# Patient Record
Sex: MALE | Race: WHITE | NOT HISPANIC OR LATINO | URBAN - METROPOLITAN AREA
[De-identification: names, ages, dates, MRNs, and addresses within clinical notes are randomized per-mention and may not be internally consistent; named-entity substitution may affect disease eponyms.]

---

## 2022-04-27 ENCOUNTER — OFFICE VISIT (OUTPATIENT)
Dept: URBAN - METROPOLITAN AREA CLINIC 88 | Facility: CLINIC | Age: 77
End: 2022-04-27
Payer: MEDICARE

## 2022-04-27 DIAGNOSIS — H43.811 VITREOUS DEGENERATION, RIGHT EYE: Primary | ICD-10-CM

## 2022-04-27 DIAGNOSIS — H40.013 OPEN ANGLE WITH BORDERLINE FINDINGS, LOW RISK, BILATERAL: ICD-10-CM

## 2022-04-27 DIAGNOSIS — H25.13 AGE-RELATED NUCLEAR CATARACT, BILATERAL: ICD-10-CM

## 2022-04-27 PROCEDURE — 99203 OFFICE O/P NEW LOW 30 MIN: CPT | Performed by: OPHTHALMOLOGY

## 2022-04-27 ASSESSMENT — INTRAOCULAR PRESSURE
OS: 12
OD: 12

## 2022-04-27 NOTE — IMPRESSION/PLAN
Impression: Vitreous degeneration, right eye: H43.811. Plan: Discussed diagnosis in detail with patient. There is no evidence of retinal pathology. All signs and risks of retinal detachment and tears were discussed in detail. Patient was advised if any changes of VA, flashes, floaters or if curtain fall in [facility]. Patient instructed to call the office immediately if any symptoms noted.

## 2022-05-05 ENCOUNTER — OFFICE VISIT (OUTPATIENT)
Dept: URBAN - METROPOLITAN AREA CLINIC 88 | Facility: CLINIC | Age: 77
End: 2022-05-05
Payer: MEDICARE

## 2022-05-05 DIAGNOSIS — H33.21 RETINAL DETACHMENT OF RIGHT EYE: Primary | ICD-10-CM

## 2022-05-05 PROCEDURE — 99213 OFFICE O/P EST LOW 20 MIN: CPT | Performed by: OPHTHALMOLOGY

## 2022-05-05 ASSESSMENT — INTRAOCULAR PRESSURE
OS: 12
OD: 12

## 2022-05-05 NOTE — IMPRESSION/PLAN
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 Plan: Discussed eye findings with patient. Patient is being referred to [facility] for evaluation and treatment of Retinal detachment w/macula off.

## 2022-05-05 NOTE — IMPRESSION/PLAN
Impression: Age-related nuclear cataract, bilateral: H25.13. Plan: Surgery not indicated, will monitor.

## 2022-08-30 ENCOUNTER — OFFICE VISIT (OUTPATIENT)
Dept: URBAN - METROPOLITAN AREA CLINIC 88 | Facility: CLINIC | Age: 77
End: 2022-08-30
Payer: MEDICARE

## 2022-08-30 DIAGNOSIS — H35.371 PUCKERING OF MACULA, RIGHT EYE: ICD-10-CM

## 2022-08-30 DIAGNOSIS — H25.811 COMBINED FORMS OF AGE-RELATED CATARACT, RIGHT EYE: Primary | ICD-10-CM

## 2022-08-30 DIAGNOSIS — H33.21 RETINAL DETACHMENT OF RIGHT EYE: ICD-10-CM

## 2022-08-30 DIAGNOSIS — H25.12 AGE-RELATED NUCLEAR CATARACT, LEFT EYE: ICD-10-CM

## 2022-08-30 PROCEDURE — 92134 CPTRZ OPH DX IMG PST SGM RTA: CPT | Performed by: OPHTHALMOLOGY

## 2022-08-30 PROCEDURE — 92136 OPHTHALMIC BIOMETRY: CPT | Performed by: OPHTHALMOLOGY

## 2022-08-30 PROCEDURE — 99214 OFFICE O/P EST MOD 30 MIN: CPT | Performed by: OPHTHALMOLOGY

## 2022-08-30 ASSESSMENT — VISUAL ACUITY
OS: 20/25
OD: 20/100

## 2022-08-30 ASSESSMENT — INTRAOCULAR PRESSURE
OS: 11
OD: 10

## 2022-08-30 NOTE — IMPRESSION/PLAN
Impression: Puckering of macula, right eye: H35.371. Plan: Discussed diagnosis in detail with patient. We discussed with the patient he may need Retinal surgery in the future after his Cataract surgery OD.

## 2022-08-30 NOTE — IMPRESSION/PLAN
Impression: Combined forms of age-related cataract, right eye: H25.811. Plan: Cataracts account for the patient's complaints. Discussed all risks, benefits, procedures and recovery. Patient understands changing glasses will not improve vision. Patient desires to have surgery, recommend phacoemulsification with intraocular lens. Surgical risks and benefits were discussed, explained and to patient. RL2 Schedule Cataract sx OD. Final post operative refractive decision will be made by Operative Surgeon. Pred-Moxi-Nepafenac/generic drops/dexycu Patient underwent PPV with Scleral Buckle OD in June 2022. !!

## 2022-10-25 ENCOUNTER — SURGERY (OUTPATIENT)
Dept: URBAN - METROPOLITAN AREA SURGERY 56 | Facility: SURGERY | Age: 77
End: 2022-10-25
Payer: MEDICARE

## 2022-10-25 PROCEDURE — 66984 XCAPSL CTRC RMVL W/O ECP: CPT | Performed by: OPHTHALMOLOGY

## 2022-10-26 ENCOUNTER — POST-OPERATIVE VISIT (OUTPATIENT)
Dept: URBAN - METROPOLITAN AREA CLINIC 88 | Facility: CLINIC | Age: 77
End: 2022-10-26
Payer: MEDICARE

## 2022-10-26 DIAGNOSIS — Z48.810 ENCOUNTER FOR SURGICAL AFTERCARE FOLLOWING SURGERY ON A SENSE ORGAN: Primary | ICD-10-CM

## 2022-10-26 PROCEDURE — 99024 POSTOP FOLLOW-UP VISIT: CPT | Performed by: OPTOMETRIST

## 2022-10-26 ASSESSMENT — INTRAOCULAR PRESSURE: OD: 11

## 2022-10-26 NOTE — IMPRESSION/PLAN
Impression: S/P Cataract Extraction by phacoemulsification without IOL placement OD - 1 Day. Encounter for surgical aftercare following surgery on a sense organ  Z48.810. Post operative instructions reviewed - Plan: Patient educated to post operative status. Advised no heavy lifting or strenuous activity for at least one week and no swimming for three weeks. Patient knows to not rub their eye and to use eye shield at night for one week. Avoiding water from contacting the eye was discussed. Patient advised to call immediately for any problems including, but not limited to: pain, redness, increase in floaters, flashing lights, changes in peripheral vision, decreased vision, and/or any other problems or concerns. Patient stated an understanding of all the instructions. Follow-up in approximately one week / prn for additional monitoring. --Advised patient to use artificial tears for comfort.

## 2022-11-04 ENCOUNTER — POST-OPERATIVE VISIT (OUTPATIENT)
Dept: URBAN - METROPOLITAN AREA CLINIC 88 | Facility: CLINIC | Age: 77
End: 2022-11-04
Payer: MEDICARE

## 2022-11-04 DIAGNOSIS — Z48.810 ENCOUNTER FOR SURGICAL AFTERCARE FOLLOWING SURGERY ON A SENSE ORGAN: Primary | ICD-10-CM

## 2022-11-04 PROCEDURE — 99024 POSTOP FOLLOW-UP VISIT: CPT | Performed by: OPTOMETRIST

## 2022-11-04 ASSESSMENT — VISUAL ACUITY: OD: 20/30

## 2022-11-04 ASSESSMENT — INTRAOCULAR PRESSURE: OD: 13

## 2022-11-04 NOTE — IMPRESSION/PLAN
Impression: S/P Cataract Extraction by phacoemulsification without IOL placement OD - 10 Days. Encounter for surgical aftercare following surgery on a sense organ  Z48.810. Post operative instructions reviewed - Plan: Patient educated to post operative status. Patient knows to not rub their eye. Patient advised to call immediately for any problems including, but not limited to: pain, redness, increase in floaters, flashing lights, changes in peripheral vision, decreased vision, and/or any other problems or concerns. Patient stated an understanding of all the instructions. Follow-up in approximately three weeks / prn for additional monitoring. --Advised patient to use artificial tears for comfort.

## 2022-11-18 ENCOUNTER — POST-OPERATIVE VISIT (OUTPATIENT)
Dept: URBAN - METROPOLITAN AREA CLINIC 88 | Facility: CLINIC | Age: 77
End: 2022-11-18
Payer: MEDICARE

## 2022-11-18 DIAGNOSIS — Z48.810 ENCOUNTER FOR SURGICAL AFTERCARE FOLLOWING SURGERY ON A SENSE ORGAN: Primary | ICD-10-CM

## 2022-11-18 PROCEDURE — 99024 POSTOP FOLLOW-UP VISIT: CPT | Performed by: OPHTHALMOLOGY

## 2022-11-18 ASSESSMENT — VISUAL ACUITY: OD: 20/30

## 2022-11-18 ASSESSMENT — INTRAOCULAR PRESSURE: OD: 13

## 2022-11-18 NOTE — IMPRESSION/PLAN
Impression: S/P Cataract Extraction by phacoemulsification without IOL placement OD - 24 Days. Encounter for surgical aftercare following surgery on a sense organ  Z48.810. Plan: Advised patient OD is okay and there are no complications at this time. Recommended an updated spec rx to help improve vision, patient states he has apt with Selina Aly for a refraction. FB sensation can be secondary from scleral buckle or from dryness recommended lubrication PF.

## 2022-12-07 ENCOUNTER — TESTING ONLY (OUTPATIENT)
Dept: URBAN - METROPOLITAN AREA CLINIC 88 | Facility: CLINIC | Age: 77
End: 2022-12-07
Payer: MEDICARE

## 2022-12-07 DIAGNOSIS — H52.4 PRESBYOPIA: Primary | ICD-10-CM

## 2022-12-07 ASSESSMENT — VISUAL ACUITY
OS: 20/20
OD: 20/40

## 2023-01-24 ENCOUNTER — OFFICE VISIT (OUTPATIENT)
Dept: URBAN - METROPOLITAN AREA CLINIC 88 | Facility: CLINIC | Age: 78
End: 2023-01-24
Payer: MEDICARE

## 2023-01-24 DIAGNOSIS — H53.2 DIPLOPIA: Primary | ICD-10-CM

## 2023-01-24 DIAGNOSIS — H52.4 PRESBYOPIA: ICD-10-CM

## 2023-01-24 DIAGNOSIS — H50.52 EXOPHORIA: ICD-10-CM

## 2023-01-24 PROCEDURE — 99212 OFFICE O/P EST SF 10 MIN: CPT | Performed by: OPHTHALMOLOGY

## 2023-01-24 ASSESSMENT — VISUAL ACUITY
OS: 20/20
OD: 20/30

## 2023-01-24 NOTE — IMPRESSION/PLAN
Impression: Presbyopia: H52.4. Plan: Patient is returning with Dr. Aldo Barroso for glasses recheck and Prism.

## 2023-02-03 ENCOUNTER — TESTING ONLY (OUTPATIENT)
Dept: URBAN - METROPOLITAN AREA CLINIC 88 | Facility: CLINIC | Age: 78
End: 2023-02-03
Payer: MEDICARE

## 2023-02-03 DIAGNOSIS — H53.2 DIPLOPIA: Primary | ICD-10-CM

## 2023-02-03 PROCEDURE — V2799 MISC VISION ITEM OR SERVICE: HCPCS | Performed by: OPTOMETRIST

## 2023-02-03 ASSESSMENT — INTRAOCULAR PRESSURE
OS: 7
OD: 7

## 2023-02-03 ASSESSMENT — VISUAL ACUITY
OS: 20/20
OD: 20/30

## 2023-02-03 NOTE — IMPRESSION/PLAN
Impression: Diplopia: H53.2. Plan: Rx stable with low vertical deviation in primary gaze 1pd. Trialed prism and deferred by patient in primary gaze. Continue with current rx. Pt educated to move head in side gaze to decrease diplopia symptoms.

## 2023-04-28 ENCOUNTER — OFFICE VISIT (OUTPATIENT)
Dept: URBAN - METROPOLITAN AREA CLINIC 88 | Facility: CLINIC | Age: 78
End: 2023-04-28
Payer: COMMERCIAL

## 2023-04-28 DIAGNOSIS — H33.21 RETINAL DETACHMENT OF RIGHT EYE: ICD-10-CM

## 2023-04-28 DIAGNOSIS — H25.12 AGE-RELATED NUCLEAR CATARACT, LEFT EYE: ICD-10-CM

## 2023-04-28 DIAGNOSIS — H35.371 PUCKERING OF MACULA, RIGHT EYE: Primary | ICD-10-CM

## 2023-04-28 DIAGNOSIS — Z96.1 PRESENCE OF INTRAOCULAR LENS: ICD-10-CM

## 2023-04-28 PROCEDURE — 99214 OFFICE O/P EST MOD 30 MIN: CPT | Performed by: OPHTHALMOLOGY

## 2023-04-28 ASSESSMENT — INTRAOCULAR PRESSURE
OD: 9
OS: 10

## 2023-04-28 NOTE — IMPRESSION/PLAN
Impression: Age-related nuclear cataract, left eye: H25.12. Plan: Discussed status with patient. Patient is not a candidate for Cataract surgery at this time. Will continue to monitor. Patient will monitor for any decrease in vision and will notify clinic if any symptoms experienced.

## 2023-12-21 ENCOUNTER — OFFICE VISIT (OUTPATIENT)
Dept: URBAN - METROPOLITAN AREA CLINIC 88 | Facility: CLINIC | Age: 78
End: 2023-12-21
Payer: COMMERCIAL

## 2023-12-21 DIAGNOSIS — H26.491 OTHER SECONDARY CATARACT, RIGHT EYE: ICD-10-CM

## 2023-12-21 DIAGNOSIS — H35.371 PUCKERING OF MACULA, RIGHT EYE: ICD-10-CM

## 2023-12-21 DIAGNOSIS — H33.21 RETINAL DETACHMENT OF RIGHT EYE: ICD-10-CM

## 2023-12-21 DIAGNOSIS — Z96.1 PRESENCE OF INTRAOCULAR LENS: ICD-10-CM

## 2023-12-21 DIAGNOSIS — H25.12 AGE-RELATED NUCLEAR CATARACT, LEFT EYE: Primary | ICD-10-CM

## 2023-12-21 PROCEDURE — 99213 OFFICE O/P EST LOW 20 MIN: CPT | Performed by: OPHTHALMOLOGY

## 2023-12-21 ASSESSMENT — INTRAOCULAR PRESSURE
OD: 8
OS: 9

## 2023-12-21 ASSESSMENT — VISUAL ACUITY
OS: 20/20
OD: 20/30

## 2023-12-26 ENCOUNTER — SURGERY (OUTPATIENT)
Dept: URBAN - METROPOLITAN AREA SURGERY 56 | Facility: SURGERY | Age: 78
End: 2023-12-26
Payer: COMMERCIAL

## 2023-12-26 PROCEDURE — 66821 AFTER CATARACT LASER SURGERY: CPT | Performed by: OPHTHALMOLOGY

## 2024-01-26 ENCOUNTER — POST-OPERATIVE VISIT (OUTPATIENT)
Dept: URBAN - METROPOLITAN AREA CLINIC 88 | Facility: CLINIC | Age: 79
End: 2024-01-26
Payer: COMMERCIAL

## 2024-01-26 DIAGNOSIS — Z48.810 ENCOUNTER FOR SURGICAL AFTERCARE FOLLOWING SURGERY ON A SENSE ORGAN: Primary | ICD-10-CM

## 2024-01-26 PROCEDURE — 99024 POSTOP FOLLOW-UP VISIT: CPT | Performed by: OPHTHALMOLOGY

## 2024-01-26 ASSESSMENT — INTRAOCULAR PRESSURE: OD: 10

## 2024-11-26 ENCOUNTER — OFFICE VISIT (OUTPATIENT)
Dept: URBAN - METROPOLITAN AREA CLINIC 88 | Facility: CLINIC | Age: 79
End: 2024-11-26
Payer: COMMERCIAL

## 2024-11-26 DIAGNOSIS — H35.371 PUCKERING OF MACULA, RIGHT EYE: ICD-10-CM

## 2024-11-26 DIAGNOSIS — Z96.1 PRESENCE OF INTRAOCULAR LENS: ICD-10-CM

## 2024-11-26 DIAGNOSIS — H25.12 AGE-RELATED NUCLEAR CATARACT, LEFT EYE: Primary | ICD-10-CM

## 2024-11-26 DIAGNOSIS — H33.21 RETINAL DETACHMENT OF RIGHT EYE: ICD-10-CM

## 2024-11-26 DIAGNOSIS — H52.4 PRESBYOPIA: ICD-10-CM

## 2024-11-26 PROCEDURE — 99213 OFFICE O/P EST LOW 20 MIN: CPT | Performed by: OPHTHALMOLOGY

## 2024-11-26 ASSESSMENT — INTRAOCULAR PRESSURE
OD: 10
OS: 10